# Patient Record
Sex: MALE | Race: WHITE | NOT HISPANIC OR LATINO
[De-identification: names, ages, dates, MRNs, and addresses within clinical notes are randomized per-mention and may not be internally consistent; named-entity substitution may affect disease eponyms.]

---

## 2017-03-04 ENCOUNTER — TRANSCRIPTION ENCOUNTER (OUTPATIENT)
Age: 42
End: 2017-03-04

## 2020-05-18 ENCOUNTER — LABORATORY RESULT (OUTPATIENT)
Age: 45
End: 2020-05-18

## 2023-08-23 ENCOUNTER — APPOINTMENT (OUTPATIENT)
Dept: ORTHOPEDIC SURGERY | Facility: CLINIC | Age: 48
End: 2023-08-23
Payer: COMMERCIAL

## 2023-08-23 VITALS — BODY MASS INDEX: 34.01 KG/M2 | WEIGHT: 265 LBS | HEIGHT: 74 IN

## 2023-08-23 DIAGNOSIS — M17.11 UNILATERAL PRIMARY OSTEOARTHRITIS, RIGHT KNEE: ICD-10-CM

## 2023-08-23 DIAGNOSIS — M16.12 UNILATERAL PRIMARY OSTEOARTHRITIS, LEFT HIP: ICD-10-CM

## 2023-08-23 DIAGNOSIS — Z78.9 OTHER SPECIFIED HEALTH STATUS: ICD-10-CM

## 2023-08-23 PROCEDURE — 73564 X-RAY EXAM KNEE 4 OR MORE: CPT | Mod: RT

## 2023-08-23 PROCEDURE — 73502 X-RAY EXAM HIP UNI 2-3 VIEWS: CPT

## 2023-08-23 PROCEDURE — 99204 OFFICE O/P NEW MOD 45 MIN: CPT

## 2023-08-23 RX ORDER — PANTOPRAZOLE 40 MG/1
TABLET, DELAYED RELEASE ORAL
Refills: 0 | Status: ACTIVE | COMMUNITY

## 2023-08-26 NOTE — HISTORY OF PRESENT ILLNESS
[4] : 4 [Dull/Aching] : dull/aching [Radiating] : radiating [Tightness] : tightness [Sleep] : sleep [Nothing helps with pain getting better] : Nothing helps with pain getting better [Sitting] : sitting [Walking] : walking [Bending forward] : bending forward [Stairs] : stairs [Lying in bed] : lying in bed [] : no [FreeTextEntry1] : Lt. Hip and Rt. Knee [FreeTextEntry5] : 49 y/o M presents for NP eval. of Lt. Hip and Rt. Knee. Pt reports of chronic hip pain for the past 2-3 years and knee pain for the past few months. Pt is unsure of any recent related trauma/injury to area. 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 No prior tx.  [FreeTextEntry7] : Lt. frankin [de-identified] : bending knee

## 2023-08-26 NOTE — ASSESSMENT
[FreeTextEntry1] : The patient is a 47 yo man presenting with left hip and right knee pain. His hip is the primary reason he is here. The patient denies trauma to either body part. He denies paresthesias or numbness. He uses oral AIs as needed. The patient has not had formal treatment.  The left hip is painful to both the lateral hip and groin. The lateral hip is more painful. The patient has pain with using stairs and putting on socks and shoes. He has pain with hiking with his kids. The patient has pain at night but not at rest.  As for his right knee pain, the patient has consistent and moderate pain to the medial knee. He has pain with distances and playing soccer. He also has increased pain with descending steps. He denies pain at rest.   Left hip exam: The patient presents in no apparent distress. Neurovascularly intact. Sensation intact to  left lower extremity. No scars, cuts or abrasions. 2+ pulses to posterior tibialis. ROM is full and painless. + abductor tenderness. + groin pain. + lateral hip tenderness. - radiculopathy. +straight leg raise with pain. 5/5 abductor strength. + pain with forced ER/IR.  Right knee exam: No rashes, scars, or abrasions. Neurovascularly intact. Tender to the medial joint line. Ranging from 0-120. No varus/valgus deformity. No instability. Anterior/posterior drawer negative, - Mcmurrays. - Lachmans.  There is no effusion.  The right hip exam is normal.  X-rays done in the office today of the right knee 4 views weightbearing show moderate to severe degenerative changes in the medial compartment with narrowing but not bone-on-bone.  There is a slight varus deformity.  There are no obvious tumors masses or calcifications seen.  X-rays done in the office today of the left hip 2 views and the AP pelvis show severe bone-on-bone arthritis of the left hip.  The right hip is mildly affected.  There are no obvious tumors masses or calcifications seen.  Plan at this time is observation only.  He will take Advil as needed.  His pain is not severe enough in either joint to recommend any intervention.  We will see him back in the office as needed.